# Patient Record
Sex: MALE | Race: WHITE | NOT HISPANIC OR LATINO | URBAN - METROPOLITAN AREA
[De-identification: names, ages, dates, MRNs, and addresses within clinical notes are randomized per-mention and may not be internally consistent; named-entity substitution may affect disease eponyms.]

---

## 2020-08-23 ENCOUNTER — EMERGENCY (EMERGENCY)
Facility: HOSPITAL | Age: 55
LOS: 1 days | Discharge: HOME | End: 2020-08-23
Admitting: EMERGENCY MEDICINE
Payer: COMMERCIAL

## 2020-08-23 VITALS
SYSTOLIC BLOOD PRESSURE: 124 MMHG | TEMPERATURE: 98 F | OXYGEN SATURATION: 97 % | RESPIRATION RATE: 20 BRPM | DIASTOLIC BLOOD PRESSURE: 75 MMHG | HEART RATE: 119 BPM

## 2020-08-23 DIAGNOSIS — Z20.828 CONTACT WITH AND (SUSPECTED) EXPOSURE TO OTHER VIRAL COMMUNICABLE DISEASES: ICD-10-CM

## 2020-08-23 PROCEDURE — 99283 EMERGENCY DEPT VISIT LOW MDM: CPT

## 2020-08-23 NOTE — ED PROVIDER NOTE - OBJECTIVE STATEMENT
55 year old male with no PMHx presents for COVID swab for flight on friday. no symptoms currently  Denies sore throat, cough, SOB, CP, palpitations, wheezing, abdominal pain, N/V/D/C, change in urinary/bowel function, dysuria, hematuria, flank pain, malaise, rash, HA, and dizziness.  No recent travel or sick contact noted.

## 2020-08-23 NOTE — ED PROVIDER NOTE - NSFOLLOWUPINSTRUCTIONS_ED_ALL_ED_FT
Follow up with your primary care doctor   Please review the covid discharge packet  Take tylenol 975mg every 6 hours as needed for fever  Stay self isolated for 14 days from onset of your symptoms.  You may need to self isolate longer if you have symptoms beyond 14 days.    Generally speaking, you should have at least 2 days of no symptom including feeling feverish or chills before going out of self isolation.   You can be contagious even without symptoms.   Return immediately for any new or worsening symptoms or any new concerns

## 2020-08-23 NOTE — ED PROVIDER NOTE - PATIENT PORTAL LINK FT
You can access the FollowMyHealth Patient Portal offered by [facility] by registering at the following website: [email or web address]. By joining [facility]’s FollowMyHealth portal, you will also be able to view your health information using other applications (apps) compatible with our system.

## 2020-08-28 ENCOUNTER — EMERGENCY (EMERGENCY)
Facility: HOSPITAL | Age: 55
LOS: 1 days | Discharge: HOME | End: 2020-08-28
Admitting: EMERGENCY MEDICINE
Payer: COMMERCIAL

## 2020-08-28 VITALS
WEIGHT: 176.37 LBS | OXYGEN SATURATION: 97 % | HEART RATE: 80 BPM | HEIGHT: 67 IN | SYSTOLIC BLOOD PRESSURE: 130 MMHG | TEMPERATURE: 99 F | DIASTOLIC BLOOD PRESSURE: 71 MMHG | RESPIRATION RATE: 17 BRPM

## 2020-08-28 PROCEDURE — 99283 EMERGENCY DEPT VISIT LOW MDM: CPT

## 2020-08-28 NOTE — ED PROVIDER NOTE - CLINICAL SUMMARY MEDICAL DECISION MAKING FREE TEXT BOX
patient present for covid testing. asymptomatic, no known exposure to COVID + individuals. swab sent.

## 2020-08-28 NOTE — ED PROVIDER NOTE - PATIENT PORTAL LINK FT
You can access the FollowMyHealth Patient Portal offered by [facility] by registering at the following website: [email or web address]. By joining [employer]’s FollowMyHealth portal, you will also be able to view your health information using other applications (apps) compatible with our system.

## 2020-09-01 DIAGNOSIS — Z20.828 CONTACT WITH AND (SUSPECTED) EXPOSURE TO OTHER VIRAL COMMUNICABLE DISEASES: ICD-10-CM

## 2021-12-23 ENCOUNTER — NON-APPOINTMENT (OUTPATIENT)
Age: 56
End: 2021-12-23

## 2021-12-23 ENCOUNTER — APPOINTMENT (OUTPATIENT)
Dept: UROLOGY | Facility: CLINIC | Age: 56
End: 2021-12-23
Payer: COMMERCIAL

## 2021-12-23 VITALS
SYSTOLIC BLOOD PRESSURE: 144 MMHG | BODY MASS INDEX: 26.68 KG/M2 | HEART RATE: 92 BPM | HEIGHT: 67 IN | TEMPERATURE: 97.1 F | WEIGHT: 170 LBS | DIASTOLIC BLOOD PRESSURE: 94 MMHG

## 2021-12-23 DIAGNOSIS — Z78.9 OTHER SPECIFIED HEALTH STATUS: ICD-10-CM

## 2021-12-23 PROBLEM — Z00.00 ENCOUNTER FOR PREVENTIVE HEALTH EXAMINATION: Status: ACTIVE | Noted: 2021-12-23

## 2021-12-23 PROCEDURE — 51798 US URINE CAPACITY MEASURE: CPT

## 2021-12-23 PROCEDURE — 99204 OFFICE O/P NEW MOD 45 MIN: CPT

## 2021-12-23 PROCEDURE — 51741 ELECTRO-UROFLOWMETRY FIRST: CPT

## 2021-12-23 RX ORDER — BETAMETHASONE DIPROPIONATE 0.5 MG/G
0.05 CREAM TOPICAL TWICE DAILY
Qty: 1 | Refills: 0 | Status: ACTIVE | COMMUNITY
Start: 2021-12-23 | End: 1900-01-01

## 2021-12-23 RX ORDER — TESTOSTERONE CYPIONATE 100 MG/ML
100 INJECTION, SOLUTION INTRAMUSCULAR
Refills: 0 | Status: ACTIVE | COMMUNITY

## 2021-12-24 NOTE — ASSESSMENT
[FreeTextEntry1] : Mr Saenz is a 56 year old rodas male with :\par 1. sexual dysfunction\par 2. phimosis\par 3. LUTS\par \par His utilizes a "constriction" ring and has variable success function.  He utilzes oral medications in conjuction.  At this rob he will continue curent regimen. When he returns from [facility] we will discuss function and options.\par \par He has mild sympoms of LUTs.  He has a low PVR (18) \par We  discussed with FRANCISCO SAENZ the possible indications for treatment of prostatic obstruction including:\par 1 urinary symptoms and quality of life issues.  At this time he has no absolute indication for medical intervention.  He does not want to pursue treatment for QOL at this time.\par \par We discussed the findings of a probable spermaticocele (right ) and an atrophic testes.  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  We discussed proceeding with scrotal ultrasound when patient returns from [place].\par \par He has phimosis with chronic inlammation.\par He notes pain with retraction of the foreskin during sexual intercourse \par I discussed  withFRANCISCO SAENZ  that a circumcision is a surgical procedure that removes the prepuce which is the  the skin that covers the glans or "head" of the penis.\par He is not willing to proceed We discussed elective nature of procedure.\par We discussed  option of attempting betamethasone cream for short course for phimosis\par \par He also had chronic scrotal skin changes that he states occurs wheh he utilizes a rubber constriction ring\par We discussed:  .1 skin moisturizers and 2. topical hydrocortisone  )(0.25%) OTC\par \par \par \par

## 2021-12-24 NOTE — HISTORY OF PRESENT ILLNESS
[FreeTextEntry1] : 56 year old [place] "nose" for [employer]\par single; rodas; sexually active \par utilizes condoms\par HIV (-); STI testing negative\par complaining:\par 1. erectile dysfunction persistent; attempted viagra 100mg ; variable by partner; variable response\par attempted cialis not as effective as viagra; JENELLE 5\par 2. phimosis and dysuria\par 3. nocturia 2-3; mild frequency; poor stream; no treatment; IPSS 18\par

## 2021-12-24 NOTE — LETTER BODY
[FreeTextEntry2] : Angel Negron MD\par [address]\par [address]\par [facility], [place] [address]\par  [FreeTextEntry1] : Dear Angel,\par \par \par Thank you for your kind referral.  I am enclosing a copy of my office note for your information.\par \par I will keep you informed of any developments.\par \par Feel free to contact me if you have any questions.\par \par Sincerely,\par \par Danyel Valerio MD, FACS\par Professor of Urology\par [facility]\par \par [address]\par [place], [place] [address]\par \par [address]\[place], [place] [address]\par \par Office Telephone \par [phone]\par \par Fax\par [phone]\par \par \par

## 2021-12-24 NOTE — PHYSICAL EXAM
[Abdomen Tenderness] : non-tender [] : no hepato-splenomegaly [Abdomen Mass (___ Cm)] : no abdominal mass palpated [Penis Abnormality] : normal uncircumcised penis [Prostate Enlargement] : the prostate was not enlarged [Prostate Tenderness] : the prostate was not tender [No Prostate Nodules] : no prostate nodules [FreeTextEntry1] : atrophic left testicle; right spermatocele; chronic changes of scrotal skin with thickening; PVR 18 cc; flow 8.3 cc sec; volume 80.5 cc

## 2022-02-17 ENCOUNTER — APPOINTMENT (OUTPATIENT)
Dept: UROLOGY | Facility: CLINIC | Age: 57
End: 2022-02-17

## 2022-03-17 ENCOUNTER — APPOINTMENT (OUTPATIENT)
Dept: UROLOGY | Facility: CLINIC | Age: 57
End: 2022-03-17

## 2022-03-31 ENCOUNTER — APPOINTMENT (OUTPATIENT)
Dept: UROLOGY | Facility: CLINIC | Age: 57
End: 2022-03-31
Payer: COMMERCIAL

## 2022-03-31 PROCEDURE — 99214 OFFICE O/P EST MOD 30 MIN: CPT

## 2022-03-31 NOTE — PHYSICAL EXAM
[Penis Abnormality] : normal uncircumcised penis [Prostate Enlargement] : the prostate was not enlarged [Prostate Tenderness] : the prostate was not tender [No Prostate Nodules] : no prostate nodules [FreeTextEntry1] : phimotic but able to retracted; atrophic left testicle; right testicel without masses  ; chronic thickened changes of scrotal skin with thickening

## 2022-03-31 NOTE — HISTORY OF PRESENT ILLNESS
[FreeTextEntry1] : 56 year old [place]ian "nose" for [employer]\par single; rodas; sexually active \par utilizes condoms\par HIV (-); STI testing negative\par complaining:\par 1. erectile dysfunction persistent; attempted viagra 100mg ; variable by partner; variable response\par attempted cialis not as effective as viagra; JENELLE\par 3. nocturia 2-3; mild frequency; poor stream; no treatment; IPSS 18\par \par 3.31.2022\par returns  re phimosis and scrotal pathology

## 2022-03-31 NOTE — ASSESSMENT
[FreeTextEntry1] : Mr Saenz is a 56 year old rodas male with :\par 1. sexual dysfunction\par 2. phimosis\par 3. LUTS\par \par His utilizes a "constriction" ring and has variable success function.  He utilzes oral medications in conjuction.  At this rob he will continue curent regimen. When he returns from [facility] we will discuss function and options.\par \par He has mild sympoms of LUTs.  He has a low PVR (18) \par We  discussed with FRANCISCO SAENZ the possible indications for treatment of prostatic obstruction including:\par 1 urinary symptoms and quality of life issues.  At this time he has no absolute indication for medical intervention.  He does not want to pursue treatment for QOL at this time.\par \par We discussed the findings of a probable spermaticocele (right ) and an atrophic testes.  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  We discussed proceeding with scrotal ultrasound when patient returns from [facility].\par \par He has phimosis with chronic inflammation.\par He notes pain with retraction of the foreskin during sexual intercourse \par I discussed  with FRANCISCO SAENZ  that a circumcision is a surgical procedure that removes the prepuce which is the  the skin that covers the glans or "head" of the penis.\par He is not willing to proceed We discussed elective nature of procedure.\par We discussed  option of attempting betamethasone cream for short course for phimosis\par \par He also had chronic scrotal skin changes that he states occurs wheh he utilizes a rubber constriction ring\par We discussed:  .1 skin moisturizers and 2. topical hydrocortisone  )(0.25%) OTC\par \par \par \par 3.31.2022\par reexamination confirms phimosis\par patient has not utilized betamethasone\par not willing to proceed with circumcision\par \par scrotal skin continues to be thickened\par he states this has been long standing\par discussed likely reaction to encompasing penis when using rubber cock ring\par discussed avoiding "cock ring" \par utilizing viagra 100 mg with good response\par \par continues on Testosterone under direction of Dr Negron\par patient will have results from labs \par he has not has labs for 6 mongs

## 2022-04-01 LAB
ALBUMIN SERPL ELPH-MCNC: 4.9 G/DL
ALP BLD-CCNC: 73 U/L
ALT SERPL-CCNC: 22 U/L
ANION GAP SERPL CALC-SCNC: 11 MMOL/L
AST SERPL-CCNC: 22 U/L
BILIRUB SERPL-MCNC: 0.3 MG/DL
BUN SERPL-MCNC: 24 MG/DL
CALCIUM SERPL-MCNC: 9.6 MG/DL
CHLORIDE SERPL-SCNC: 98 MMOL/L
CO2 SERPL-SCNC: 27 MMOL/L
CREAT SERPL-MCNC: 0.97 MG/DL
EGFR: 93 ML/MIN/1.73M2
GLUCOSE SERPL-MCNC: 91 MG/DL
HCT VFR BLD CALC: 46.7 %
HGB BLD-MCNC: 15.5 G/DL
POTASSIUM SERPL-SCNC: 4.5 MMOL/L
PROT SERPL-MCNC: 7.4 G/DL
PSA SERPL-MCNC: 0.84 NG/ML
SODIUM SERPL-SCNC: 136 MMOL/L
TESTOST SERPL-MCNC: 453 NG/DL

## 2022-05-24 PROBLEM — N43.40 SPERMATOCELE: Status: ACTIVE | Noted: 2021-12-23

## 2022-05-24 PROBLEM — E29.1 HYPOGONADISM IN MALE: Status: RESOLVED | Noted: 2022-03-31 | Resolved: 2022-05-24

## 2022-05-24 PROBLEM — N50.0 ATROPHY, TESTIS: Status: ACTIVE | Noted: 2021-12-23

## 2022-05-25 ENCOUNTER — APPOINTMENT (OUTPATIENT)
Dept: UROLOGY | Facility: CLINIC | Age: 57
End: 2022-05-25

## 2022-05-25 DIAGNOSIS — N50.0 ATROPHY OF TESTIS: ICD-10-CM

## 2022-05-25 DIAGNOSIS — N43.40 SPERMATOCELE OF EPIDIDYMIS, UNSPECIFIED: ICD-10-CM

## 2022-05-25 DIAGNOSIS — E29.1 TESTICULAR HYPOFUNCTION: ICD-10-CM

## 2022-06-21 ENCOUNTER — APPOINTMENT (OUTPATIENT)
Dept: UROLOGY | Facility: CLINIC | Age: 57
End: 2022-06-21
Payer: COMMERCIAL

## 2022-06-21 VITALS — DIASTOLIC BLOOD PRESSURE: 77 MMHG | TEMPERATURE: 97.9 F | SYSTOLIC BLOOD PRESSURE: 126 MMHG | HEART RATE: 108 BPM

## 2022-06-21 DIAGNOSIS — N41.8 OTHER INFLAMMATORY DISEASES OF PROSTATE: ICD-10-CM

## 2022-06-21 DIAGNOSIS — B96.89 OTHER INFLAMMATORY DISEASES OF PROSTATE: ICD-10-CM

## 2022-06-21 PROCEDURE — 99213 OFFICE O/P EST LOW 20 MIN: CPT

## 2022-06-21 NOTE — LETTER BODY
[FreeTextEntry2] : Angel Negron MD\par [address]\par [address]\[place] [address]\par  [FreeTextEntry1] : Dear Angel,\par \par \par I had the opportunity to see your patient, Mr. FRANCISCO SAENZ in followup. I am enclosing my office note for your information.\par \par I will keep you informed of any developments.\par \par Feel free to contact me if you have any questions.\par \par Sincerely,\par \par Danyel Valerio MD, FACS\par Professor of Urology\par [facility]\par \par [address]\par [place], [place] [address]\par \par [address]\par [place], [place] [address]\par \par Office Telephone \par [phone]\par \par Fax\par [phone]\par

## 2022-06-21 NOTE — HISTORY OF PRESENT ILLNESS
[FreeTextEntry1] : 56 year old [place] "nose" for [employer]\par single; rodas; sexually active \par utilizes condoms\par HIV (-); STI testing negative\par complaining:\par 1. erectile dysfunction persistent; attempted viagra 100mg ; variable by partner; variable response\par attempted cialis not as effective as viagra; JENELLE\par 3. nocturia 2-3; mild frequency; poor stream; no treatment; IPSS 18\par \par 3.31.2022\par returns  re phimosis and scrotal pathology\par \par 5.25.2022\par \par 6.21.2022\par patient treated for prostatitis\par PROTEUS\par treated with cefuroxime 1 q 12 x 20 days (completed 18)\par complaining of lack of libido and decreased erectile function\par last sexual activity 3 weeks\par insertive sexual active; condoms [Urinary Incontinence] : no urinary incontinence [Urinary Urgency] : no urinary urgency [Urinary Frequency] : no urinary frequency [Nocturia] : no nocturia

## 2022-06-21 NOTE — PHYSICAL EXAM
[Penis Abnormality] : normal uncircumcised penis [Prostate Enlargement] : the prostate was not enlarged [Prostate Tenderness] : the prostate was not tender [No Prostate Nodules] : no prostate nodules [FreeTextEntry1] : phimotic but able to retracted;; right testicel without masses  ; chronic thickened changes of scrotal skin with thickening improved ; skin is softer and elastic

## 2022-06-21 NOTE — ASSESSMENT
[FreeTextEntry1] : Mr Saenz is a 56 year old rodas male with :\par 1. sexual dysfunction\par 2. phimosis\par 3. LUTS\par \par His utilizes a "constriction" ring and has variable success function.  He utilizes oral medications in conjunction.  At this rob he will continue current regimen. When he returns from [facility] we will discuss function and options.\par \par He has mild sympoms of LUTs.  He has a low PVR (18) \par We  discussed with FRANCISCO SAENZ the possible indications for treatment of prostatic obstruction including:\par 1 urinary symptoms and quality of life issues.  At this time he has no absolute indication for medical intervention.  He does not want to pursue treatment for QOL at this time.\par \par We discussed the findings of a probable spermatocele (right ) and an atrophic testes.  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  We discussed proceeding with scrotal ultrasound when patient returns from [facility].\par \par He has phimosis with chronic inflammation.\par He notes pain with retraction of the foreskin during sexual intercourse \par I discussed  with FRANCISCO SAENZ  that a circumcision is a surgical procedure that removes the prepuce which is the  the skin that covers the glans or "head" of the penis.\par He is not willing to proceed We discussed elective nature of procedure.\par We discussed  option of attempting betamethasone cream for short course for phimosis\par \par He also had chronic scrotal skin changes that he states occurs wheh he utilizes a rubber constriction ring\par We discussed:  .1 skin moisturizers and 2. topical hydrocortisone  )(0.25%) OTC\par \par \par \par 6.22.2022\par Patient returns.  He has an episode of acute prostatitis with severe perineal pain.  He was afebrile.  He was seen by Dr. Angel Negron.  Labs at that time demonstrated: \par wbc 11.8\par hct 42.3\par t 537\par psa 4.8\par urine proteus mirabilis \par renal USG normal\par GALL BLADDER POLYPS\par Patient responded immediately to cefuroxime.  He is asymptomatic at this time.  He is completing his course of antibiotics.  We discussed the need for follow-up urine culture and repeat CBC.  He is scheduled to see Dr. Negron next week.  His PSA was elevated at the time of the infection.  Prior PSA was normal.  This is consistent with acute prostatitis.  He also underwent a renal ultrasound this was normal.  However, there were polyps noted in the gallbladder.  We discussed this and the need for follow-up.  He will review this with Dr. Negron.  He was appreciated.\par \par He continues to complain of phimosis which is painful with erection.  We again discussed elective circumcision.  He is unwilling to proceed with this.  We discussed the utilization of betamethasone cream.  We discussed the limitation of this both in terms of affect and in terms of the duration of utilization.  He wants to attempt this again for 14 days.  We discussed gentle traction on the foreskin with the betamethasone to dilate the skin.  We also discussed the utilization of dilators.  I discussed that I have no personal experience with this but that has been reported.\par \par Plan:\par 1.  Betamethasone cream\par 2.  Follow-up with Dr. Negron regarding: A.  White blood cell count B.  Urine culture after cessation of antibiotics C.  Gallbladder findings\par 3.  Follow-up with me in 2 months\par recommend ciricumscision\par patient wants to continue to attempt betamathasome\par \par

## 2022-07-11 LAB
APPEARANCE: ABNORMAL
BACTERIA: ABNORMAL
BILIRUBIN URINE: NEGATIVE
BLOOD URINE: NEGATIVE
COLOR: ABNORMAL
GLUCOSE QUALITATIVE U: NEGATIVE
HYALINE CASTS: 2 /LPF
KETONES URINE: NEGATIVE
LEUKOCYTE ESTERASE URINE: ABNORMAL
MICROSCOPIC-UA: NORMAL
NITRITE URINE: NEGATIVE
PH URINE: 8
PROTEIN URINE: ABNORMAL
RED BLOOD CELLS URINE: 1 /HPF
SPECIFIC GRAVITY URINE: 1.01
SQUAMOUS EPITHELIAL CELLS: 2 /HPF
UROBILINOGEN URINE: NORMAL
WHITE BLOOD CELLS URINE: 14 /HPF

## 2022-07-12 ENCOUNTER — APPOINTMENT (OUTPATIENT)
Dept: UROLOGY | Facility: CLINIC | Age: 57
End: 2022-07-12

## 2022-07-12 VITALS
HEART RATE: 80 BPM | DIASTOLIC BLOOD PRESSURE: 82 MMHG | BODY MASS INDEX: 27 KG/M2 | SYSTOLIC BLOOD PRESSURE: 114 MMHG | TEMPERATURE: 98.3 F | HEIGHT: 67 IN | WEIGHT: 172 LBS

## 2022-07-12 DIAGNOSIS — A49.9 URINARY TRACT INFECTION, SITE NOT SPECIFIED: ICD-10-CM

## 2022-07-12 DIAGNOSIS — N39.0 URINARY TRACT INFECTION, SITE NOT SPECIFIED: ICD-10-CM

## 2022-07-12 LAB — BACTERIA UR CULT: ABNORMAL

## 2022-07-12 PROCEDURE — 99213 OFFICE O/P EST LOW 20 MIN: CPT

## 2022-07-12 PROCEDURE — 51798 US URINE CAPACITY MEASURE: CPT

## 2022-07-12 RX ORDER — SULFAMETHOXAZOLE AND TRIMETHOPRIM 800; 160 MG/1; MG/1
800-160 TABLET ORAL TWICE DAILY
Qty: 28 | Refills: 0 | Status: ACTIVE | COMMUNITY
Start: 2022-07-12 | End: 1900-01-01

## 2022-07-12 NOTE — PHYSICAL EXAM
[Penis Abnormality] : normal uncircumcised penis [Prostate Enlargement] : the prostate was not enlarged [Prostate Tenderness] : the prostate was not tender [No Prostate Nodules] : no prostate nodules [FreeTextEntry1] : phimotic but able to retract; PVR 45

## 2022-07-12 NOTE — ASSESSMENT
[FreeTextEntry1] : Mr Saenz is a 56 year old rodas male with :\par 1. sexual dysfunction\par 2. phimosis\par 3. LUTS\par \par His utilizes a "constriction" ring and has variable success function.  He utilizes oral medications in conjunction.  At this rob he will continue current regimen. When he returns from [facility] we will discuss function and options.\par \par He has mild sympoms of LUTs.  He has a low PVR (18) \par We  discussed with FRANCISCO SAENZ the possible indications for treatment of prostatic obstruction including:\par 1 urinary symptoms and quality of life issues.  At this time he has no absolute indication for medical intervention.  He does not want to pursue treatment for QOL at this time.\par \par We discussed the findings of a probable spermatocele (right ) and an atrophic testes.  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  We discussed proceeding with scrotal ultrasound when patient returns from [facility].\par \par He has phimosis with chronic inflammation.\par He notes pain with retraction of the foreskin during sexual intercourse \par I discussed  with FRANCISCO SAENZ  that a circumcision is a surgical procedure that removes the prepuce which is the  the skin that covers the glans or "head" of the penis.\par He is not willing to proceed We discussed elective nature of procedure.\par We discussed  option of attempting betamethasone cream for short course for phimosis\par \par He also had chronic scrotal skin changes that he states occurs wheh he utilizes a rubber constriction ring\par We discussed:  .1 skin moisturizers and 2. topical hydrocortisone  )(0.25%) OTC\par \par \par \par 6.22.2022\par Patient returns.  He has an episode of acute prostatitis with severe perineal pain.  He was afebrile.  He was seen by Dr. Angel Negron.  Labs at that time demonstrated: \par wbc 11.8\par hct 42.3\par t 537\par psa 4.8\par urine proteus mirabilis \par renal USG normal\par GALL BLADDER POLYPS\par Patient responded immediately to cefuroxime.  He is asymptomatic at this time.  He is completing his course of antibiotics.  We discussed the need for follow-up urine culture and repeat CBC.  He is scheduled to see Dr. Negron next week.  His PSA was elevated at the time of the infection.  Prior PSA was normal.  This is consistent with acute prostatitis.  He also underwent a renal ultrasound this was normal.  However, there were polyps noted in the gallbladder.  We discussed this and the need for follow-up.  He will review this with Dr. Negron.  He was appreciated.\par \par He continues to complain of phimosis which is painful with erection.  We again discussed elective circumcision.  He is unwilling to proceed with this.  We discussed the utilization of betamethasone cream.  We discussed the limitation of this both in terms of affect and in terms of the duration of utilization.  He wants to attempt this again for 14 days.  We discussed gentle traction on the foreskin with the betamethasone to dilate the skin.  We also discussed the utilization of dilators.  I discussed that I have no personal experience with this but that has been reported.\par \par Plan:\par 1.  Betamethasone cream\par 2.  Follow-up with Dr. Negron regarding: A.  White blood cell count B.  Urine culture after cessation of antibiotics C.  Gallbladder findings\par 3.  Follow-up with me in 2 months\par recommend ciricumscision\par patient wants to continue to attempt betamathasome\par \par \par 7.12.2022\par returns with symtoms of :\par 1. incomplete voiding\par 2 dysuria\par discussed recurrent vs persistent infection and need to document resolution fo UTI\par will change to TMP/SX warned re allergic reaction and avoidance of sun\par Denies allergies\par Reports and normal renal ultrasound with Dr Negron\par Plan\par 1. TMP/SX\par 2. repeat urine culture after 2 weeks antibiotics\par \par

## 2022-07-12 NOTE — HISTORY OF PRESENT ILLNESS
[FreeTextEntry1] : 56 year old [place] "nose" for [employer]\par single; rodas; sexually active \par utilizes condoms\par HIV (-); STI testing negative\par complaining:\par 1. erectile dysfunction persistent; attempted viagra 100mg ; variable by partner; variable response\par attempted cialis not as effective as viagra; JENELLE\par 3. nocturia 2-3; mild frequency; poor stream; no treatment; IPSS 18\par \par 3.31.2022\par returns  re phimosis and scrotal pathology\par \par 5.25.2022\par \par 6.21.2022\par patient treated for prostatitis\par PROTEUS\par treated with cefuroxime 1 q 12 x 20 days (completed 18)\par complaining of lack of libido and decreased erectile function\par last sexual activity 3 weeks\par insertive sexual active; condoms\par \par 7.12.2022\par returns after completion\par after completion of antibiotics was asympotomatic\par 7 day later again developed symptoms\par

## 2022-07-13 ENCOUNTER — APPOINTMENT (OUTPATIENT)
Dept: UROLOGY | Facility: CLINIC | Age: 57
End: 2022-07-13

## 2022-08-01 LAB — BACTERIA UR CULT: NORMAL

## 2022-08-10 ENCOUNTER — APPOINTMENT (OUTPATIENT)
Dept: UROLOGY | Facility: CLINIC | Age: 57
End: 2022-08-10

## 2022-08-10 VITALS
OXYGEN SATURATION: 98 % | HEART RATE: 80 BPM | TEMPERATURE: 98.3 F | SYSTOLIC BLOOD PRESSURE: 137 MMHG | DIASTOLIC BLOOD PRESSURE: 86 MMHG

## 2022-08-10 PROCEDURE — 99213 OFFICE O/P EST LOW 20 MIN: CPT

## 2022-08-10 NOTE — HISTORY OF PRESENT ILLNESS
[FreeTextEntry1] : 56 year old [place] "nose" for [employer]\par single; rodas; sexually active \par utilizes condoms\par HIV (-); STI testing negative\par complaining:\par 1. erectile dysfunction persistent; attempted viagra 100mg ; variable by partner; variable response\par attempted cialis not as effective as viagra; JENELLE\par 3. nocturia 2-3; mild frequency; poor stream; no treatment; IPSS 18\par \par 3.31.2022\par returns  re phimosis and scrotal pathology\par \par 5.25.2022\par \par 6.21.2022\par patient treated for prostatitis\par PROTEUS\par treated with cefuroxime 1 q 12 x 20 days (completed 18)\par complaining of lack of libido and decreased erectile function\par last sexual activity 3 weeks\par insertive sexual active; condoms\par \par 7.12.2022\par returns after completion\par after completion of antibiotics was asympotomatic\par 7 day later again developed symptoms\par \par 08.10.2022\par presents for repeat urine culture after completing antibiotics\par reports that symptoms have resolved

## 2022-08-10 NOTE — ADDENDUM
[FreeTextEntry1] : A portion of this note was written by [Deny Huddleston] acting as a scribe for Dr. Danyel Valerio.

## 2022-08-10 NOTE — ASSESSMENT
[FreeTextEntry1] : Mr Saenz is a 56 year old rodas male with :\par 1. sexual dysfunction\par 2. phimosis\par 3. LUTS\par \par His utilizes a "constriction" ring and has variable success function.  He utilizes oral medications in conjunction.  At this rob he will continue current regimen. When he returns from [facility] we will discuss function and options.\par \par He has mild sympoms of LUTs.  He has a low PVR (18) \par We  discussed with FRANCISCO SAENZ the possible indications for treatment of prostatic obstruction including:\par 1 urinary symptoms and quality of life issues.  At this time he has no absolute indication for medical intervention.  He does not want to pursue treatment for QOL at this time.\par \par We discussed the findings of a probable spermatocele (right ) and an atrophic testes.  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  We discussed proceeding with scrotal ultrasound when patient returns from [facility].\par \par He has phimosis with chronic inflammation.\par He notes pain with retraction of the foreskin during sexual intercourse \par I discussed  with FRANCISCO SAENZ  that a circumcision is a surgical procedure that removes the prepuce which is the  the skin that covers the glans or "head" of the penis.\par He is not willing to proceed We discussed elective nature of procedure.\par We discussed  option of attempting betamethasone cream for short course for phimosis\par \par He also had chronic scrotal skin changes that he states occurs wheh he utilizes a rubber constriction ring\par We discussed:  .1 skin moisturizers and 2. topical hydrocortisone  )(0.25%) OTC\par \par \par \par 6.22.2022\par Patient returns.  He has an episode of acute prostatitis with severe perineal pain.  He was afebrile.  He was seen by Dr. Angel Negron.  Labs at that time demonstrated: \par wbc 11.8\par hct 42.3\par t 537\par psa 4.8\par urine proteus mirabilis \par renal USG normal\par GALL BLADDER POLYPS\par Patient responded immediately to cefuroxime.  He is asymptomatic at this time.  He is completing his course of antibiotics.  We discussed the need for follow-up urine culture and repeat CBC.  He is scheduled to see Dr. Negron next week.  His PSA was elevated at the time of the infection.  Prior PSA was normal.  This is consistent with acute prostatitis.  He also underwent a renal ultrasound this was normal.  However, there were polyps noted in the gallbladder.  We discussed this and the need for follow-up.  He will review this with Dr. Negron.  He was appreciated.\par \par He continues to complain of phimosis which is painful with erection.  We again discussed elective circumcision.  He is unwilling to proceed with this.  We discussed the utilization of betamethasone cream.  We discussed the limitation of this both in terms of affect and in terms of the duration of utilization.  He wants to attempt this again for 14 days.  We discussed gentle traction on the foreskin with the betamethasone to dilate the skin.  We also discussed the utilization of dilators.  I discussed that I have no personal experience with this but that has been reported.\par \par Plan:\par 1.  Betamethasone cream\par 2.  Follow-up with Dr. Negron regarding: A.  White blood cell count B.  Urine culture after cessation of antibiotics C.  Gallbladder findings\par 3.  Follow-up with me in 2 months\par recommend ciricumscision\par patient wants to continue to attempt betamathasome\par \par \par 7.12.2022\par returns with symptoms of :\par 1. incomplete voiding\par 2 dysuria\par discussed recurrent vs persistent infection and need to document resolution fo UTI\par will change to TMP/SX warned re allergic reaction and avoidance of sun\par Denies allergies\par Reports and normal renal ultrasound with Dr Negron\par Plan\par 1. TMP/SX\par 2. repeat urine culture after 2 weeks antibiotics\par \par 08.10.2022\par Reports that symptoms have resolved after TMP/SX\par \par discussed phimosis and elective circumcision\par patient does not want to proceed but wants to manage with hygiene, steroid cream sparingly as needed and manual retraction\par \par \par Plan\par 1. Follow up in 3 months\par 2. hygiene\par 3. betamethasone \par The FRANCISCO LETTY  expressed fully understanding of the information provided, the consequences and the management.\par

## 2022-11-09 ENCOUNTER — APPOINTMENT (OUTPATIENT)
Dept: UROLOGY | Facility: CLINIC | Age: 57
End: 2022-11-09
Payer: SELF-PAY

## 2022-11-09 VITALS
HEART RATE: 78 BPM | TEMPERATURE: 97.3 F | OXYGEN SATURATION: 98 % | DIASTOLIC BLOOD PRESSURE: 84 MMHG | SYSTOLIC BLOOD PRESSURE: 131 MMHG

## 2022-11-09 DIAGNOSIS — R37 SEXUAL DYSFUNCTION, UNSPECIFIED: ICD-10-CM

## 2022-11-09 DIAGNOSIS — R39.9 UNSPECIFIED SYMPTOMS AND SIGNS INVOLVING THE GENITOURINARY SYSTEM: ICD-10-CM

## 2022-11-09 DIAGNOSIS — N47.1 PHIMOSIS: ICD-10-CM

## 2022-11-09 PROCEDURE — 51798 US URINE CAPACITY MEASURE: CPT

## 2022-11-09 PROCEDURE — 51741 ELECTRO-UROFLOWMETRY FIRST: CPT

## 2022-11-09 PROCEDURE — 99214 OFFICE O/P EST MOD 30 MIN: CPT

## 2022-11-09 RX ORDER — SILDENAFIL 100 MG/1
100 TABLET, FILM COATED ORAL
Qty: 10 | Refills: 3 | Status: ACTIVE | COMMUNITY
Start: 2022-11-09 | End: 1900-01-01

## 2022-11-09 RX ORDER — BETAMETHASONE VALERATE 1 MG/G
0.1 CREAM TOPICAL TWICE DAILY
Qty: 1 | Refills: 0 | Status: ACTIVE | COMMUNITY
Start: 2022-11-09 | End: 1900-01-01

## 2022-11-09 NOTE — HISTORY OF PRESENT ILLNESS
[FreeTextEntry1] : 56 year old [place] "[employer]" for [employer]\par single; rodas; sexually active \par utilizes condoms\par HIV (-); STI testing negative\par complaining:\par 1. erectile dysfunction persistent; attempted viagra 100mg ; variable by partner; variable response\par attempted cialis not as effective as viagra; JENELLE\par 3. nocturia 2-3; mild frequency; poor stream; no treatment; IPSS 18\par \par 3.31.2022\par returns  re phimosis and scrotal pathology\par \par 5.25.2022\par \par 6.21.2022\par patient treated for prostatitis\par PROTEUS\par treated with cefuroxime 1 q 12 x 20 days (completed 18)\par complaining of lack of libido and decreased erectile function\par last sexual activity 3 weeks\par insertive sexual active; condoms\par \par 7.12.2022\par returns after completion\par after completion of antibiotics was asymptomatic\par 7 day later again developed symptoms\par \par 08.10.2022\par presents for repeat urine culture after completing antibiotics\par reports that symptoms have resolved \par \par \par 11.9.022\par s/p treatment of prostatitis\par urinary symptoms improved\par concerned re phimosis

## 2022-11-09 NOTE — ASSESSMENT
[FreeTextEntry1] : Mr Saenz is a 56 year old rodas male with :\par 1. sexual dysfunction\par 2. phimosis\par 3. LUTS\par \par His utilizes a "constriction" ring and has variable success function.  He utilizes oral medications in conjunction.  At this rob he will continue current regimen. When he returns from [facility] we will discuss function and options.\par \par He has mild sympoms of LUTs.  He has a low PVR (18) \par We  discussed with FRANCISCO SAENZ the possible indications for treatment of prostatic obstruction including:\par 1 urinary symptoms and quality of life issues.  At this time he has no absolute indication for medical intervention.  He does not want to pursue treatment for QOL at this time.\par \par We discussed the findings of a probable spermatocele (right ) and an atrophic testes.  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  We discussed proceeding with scrotal ultrasound when patient returns from [facility].\par \par He has phimosis with chronic inflammation.\par He notes pain with retraction of the foreskin during sexual intercourse \par I discussed  with FRANCISCO SAENZ  that a circumcision is a surgical procedure that removes the prepuce which is the  the skin that covers the glans or "head" of the penis.\par He is not willing to proceed We discussed elective nature of procedure.\par We discussed  option of attempting betamethasone cream for short course for phimosis\par \par He also had chronic scrotal skin changes that he states occurs wheh he utilizes a rubber constriction ring\par We discussed:  .1 skin moisturizers and 2. topical hydrocortisone  )(0.25%) OTC\par \par \par \par 6.22.2022\par Patient returns.  He has an episode of acute prostatitis with severe perineal pain.  He was afebrile.  He was seen by Dr. Angel Negron.  Labs at that time demonstrated: \par wbc 11.8\par hct 42.3\par t 537\par psa 4.8\par urine proteus mirabilis \par renal USG normal\par GALL BLADDER POLYPS\par Patient responded immediately to cefuroxime.  He is asymptomatic at this time.  He is completing his course of antibiotics.  We discussed the need for follow-up urine culture and repeat CBC.  He is scheduled to see Dr. Negron next week.  His PSA was elevated at the time of the infection.  Prior PSA was normal.  This is consistent with acute prostatitis.  He also underwent a renal ultrasound this was normal.  However, there were polyps noted in the gallbladder.  We discussed this and the need for follow-up.  He will review this with Dr. Negron.  He was appreciated.\par \par He continues to complain of phimosis which is painful with erection.  We again discussed elective circumcision.  He is unwilling to proceed with this.  We discussed the utilization of betamethasone cream.  We discussed the limitation of this both in terms of affect and in terms of the duration of utilization.  He wants to attempt this again for 14 days.  We discussed gentle traction on the foreskin with the betamethasone to dilate the skin.  We also discussed the utilization of dilators.  I discussed that I have no personal experience with this but that has been reported.\par \par Plan:\par 1.  Betamethasone cream\par 2.  Follow-up with Dr. Negron regarding: A.  White blood cell count B.  Urine culture after cessation of antibiotics C.  Gallbladder findings\par 3.  Follow-up with me in 2 months\par recommend ciricumscision\par patient wants to continue to attempt betamathasome\par \par \par 7.12.2022\par returns with symptoms of :\par 1. incomplete voiding\par 2 dysuria\par discussed recurrent vs persistent infection and need to document resolution fo UTI\par will change to TMP/SX warned re allergic reaction and avoidance of sun\par Denies allergies\par Reports and normal renal ultrasound with Dr Negron\par Plan\par 1. TMP/SX\par 2. repeat urine culture after 2 weeks antibiotics\par \par 08.10.2022\par Reports that symptoms have resolved after TMP/SX\par \par discussed phimosis and elective circumcision\par patient does not want to proceed but wants to manage with hygiene, steroid cream sparingly as needed and manual retraction\par \par \par Plan\par 1. Follow up in 3 months\par 2. hygiene\par 3. betamethasone \par The FRANCISCO AHMADILORNE  expressed fully understanding of the information provided, the consequences and the management.\par \par \par 11.9.2022\par Patient returns today with complaint regarding his phimosis.  He is able to retract his foreskin.  However when he has an erection it is painful.  He states that this interferes with his sexual function.  He is concerned about the pain associated with pulling the foreskin back and is concerned about the psychological impact of pain on his erectile function.  He has taken oral phosphodiesterase inhibitors in the past.  The response has been variable.\par \par On examination his foreskin is retractable.  There is no significant phimosis.  However with erection he states that it is difficult to pull the foreskin back.\par Again reviewed :\par \par \par I discussed  Alie SAENZ  that a circumcision is a surgical procedure that removes the prepuce which is the  the skin that covers the glans or "head" of the penis.\par We discussed indications including:\par 1. phimosis\par 2. tearing of foreskin or frenulum with intercourse \par 3. recurrent infections\par 4. cosmetic reasons\par 5. [employer] reasons\par \par We explain that some of the common complications of a circumcision are:\par 1 pain, \par 2 bleeding,\par 3 swelling\par We discussed:\par 1 low risk of infection\par 2 injury to the urethra\par 3 change in penile sensation, \par 4. appearance of shortening.  \par We discussed elective nature of procedure.\par \par We discussed  option of attempting betamethasone cream for short course for phimosis or Mycolog/Triamcinolone for balanoposthitis.\par Patient no willing to proceed\par \par \par discussed attempting Viagra 100 mg \par Warned re priapism risk and need for immediate intervention if erection lasts more than 2 hours.  Patient instructed to report to an emergency room and explicitly inform staff of his condition and need for treatment.\par \par LUTs not interested in medical management\par Flow and PVR reviewed\par \par Plan\par 1. betamethasone\par 2 urine culture \par 3. followup in 3 months\par

## 2022-11-09 NOTE — PHYSICAL EXAM
[Penis Abnormality] : normal uncircumcised penis [Prostate Enlargement] : the prostate was not enlarged [Prostate Tenderness] : the prostate was not tender [No Prostate Nodules] : no prostate nodules [FreeTextEntry1] : phimotic but able to retract; flow  9.4cc/sec; voided volume 240.2; PVR 90

## 2022-11-11 LAB — BACTERIA UR CULT: NORMAL

## 2022-11-14 ENCOUNTER — NON-APPOINTMENT (OUTPATIENT)
Age: 57
End: 2022-11-14

## 2023-01-26 ENCOUNTER — APPOINTMENT (OUTPATIENT)
Dept: UROLOGY | Facility: CLINIC | Age: 58
End: 2023-01-26

## 2023-01-26 NOTE — ASSESSMENT
[FreeTextEntry1] : Mr Saenz is a 56 year old rodas male with :\par 1. sexual dysfunction\par 2. phimosis\par 3. LUTS\par \par His utilizes a "constriction" ring and has variable success function. He utilizes oral medications in conjunction. At this rob he will continue current regimen. When he returns from [facility] we will discuss function and options.\par \par He has mild sympoms of LUTs. He has a low PVR (18) \par We discussed with FRANCISCO SAENZ the possible indications for treatment of prostatic obstruction including:\par 1 urinary symptoms and quality of life issues. At this time he has no absolute indication for medical intervention. He does not want to pursue treatment for QOL at this time.\par \par We discussed the findings of a probable spermatocele (right ) and an atrophic testes. 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 We discussed proceeding with scrotal ultrasound when patient returns from [facility].\par \par He has phimosis with chronic inflammation.\par He notes pain with retraction of the foreskin during sexual intercourse \par I discussed with FRANCISCO SAENZ that a circumcision is a surgical procedure that removes the prepuce which is the the skin that covers the glans or "head" of the penis.\par He is not willing to proceed We discussed elective nature of procedure.\par We discussed option of attempting betamethasone cream for short course for phimosis\par \par He also had chronic scrotal skin changes that he states occurs wheh he utilizes a rubber constriction ring\par We discussed:.1 skin moisturizers and 2. topical hydrocortisone )(0.25%) OTC\par \par \par \par 6.22.2022\par Patient returns. He has an episode of acute prostatitis with severe perineal pain. He was afebrile. He was seen by Dr. Angel Negron. Labs at that time demonstrated: \par wbc 11.8\par hct 42.3\par t 537\par psa 4.8\par urine proteus mirabilis \par renal USG normal\par GALL BLADDER POLYPS\par Patient responded immediately to cefuroxime. He is asymptomatic at this time. He is completing his course of antibiotics. We discussed the need for follow-up urine culture and repeat CBC. He is scheduled to see Dr. Negron next week. His PSA was elevated at the time of the infection. Prior PSA was normal. This is consistent with acute prostatitis. He also underwent a renal ultrasound this was normal. However, there were polyps noted in the gallbladder. We discussed this and the need for follow-up. He will review this with Dr. Negron. He was appreciated.\par \par He continues to complain of phimosis which is painful with erection. We again discussed elective circumcision. He is unwilling to proceed with this. We discussed the utilization of betamethasone cream. We discussed the limitation of this both in terms of affect and in terms of the duration of utilization. He wants to attempt this again for 14 days. We discussed gentle traction on the foreskin with the betamethasone to dilate the skin. We also discussed the utilization of dilators. I discussed that I have no personal experience with this but that has been reported.\par \par Plan:\par 1. Betamethasone cream\par 2. Follow-up with Dr. Negron regarding: A. White blood cell count B. Urine culture after cessation of antibiotics C. Gallbladder findings\par 3. Follow-up with me in 2 months\par recommend ciricumscision\par patient wants to continue to attempt betamathasome\par \par \par 7.12.2022\par returns with symptoms of :\par 1. incomplete voiding\par 2 dysuria\par discussed recurrent vs persistent infection and need to document resolution fo UTI\par will change to TMP/SX warned re allergic reaction and avoidance of sun\par Denies allergies\par Reports and normal renal ultrasound with Dr Negron\par Plan\par 1. TMP/SX\par 2. repeat urine culture after 2 weeks antibiotics\par \par 08.10.2022\par Reports that symptoms have resolved after TMP/SX\par \par discussed phimosis and elective circumcision\par patient does not want to proceed but wants to manage with hygiene, steroid cream sparingly as needed and manual retraction\par \par \par Plan\par 1. Follow up in 3 months\par 2. hygiene\par 3. betamethasone \par The FRANCISCO AHMADILORNE expressed fully understanding of the information provided, the consequences and the management.\par \par \par 11.9.2022\par Patient returns today with complaint regarding his phimosis. He is able to retract his foreskin. However when he has an erection it is painful. He states that this interferes with his sexual function. He is concerned about the pain associated with pulling the foreskin back and is concerned about the psychological impact of pain on his erectile function. He has taken oral phosphodiesterase inhibitors in the past. The response has been variable.\par \par On examination his foreskin is retractable. There is no significant phimosis. However with erection he states that it is difficult to pull the foreskin back.\par Again reviewed :\par \par \par I discussed Alie SAENZ that a circumcision is a surgical procedure that removes the prepuce which is the the skin that covers the glans or "head" of the penis.\par We discussed indications including:\par 1. phimosis\par 2. tearing of foreskin or frenulum with intercourse \par 3. recurrent infections\par 4. cosmetic reasons\par 5. [employer] reasons\par \par We explain that some of the common complications of a circumcision are:\par 1 pain, \par 2 bleeding,\par 3 swelling\par We discussed:\par 1 low risk of infection\par 2 injury to the urethra\par 3 change in penile sensation, \par 4. appearance of shortening. \par We discussed elective nature of procedure.\par \par We discussed option of attempting betamethasone cream for short course for phimosis or Mycolog/Triamcinolone for balanoposthitis.\par Patient no willing to proceed\par \par \par discussed attempting Viagra 100 mg \par Warned re priapism risk and need for immediate intervention if erection lasts more than 2 hours. Patient instructed to report to an emergency room and explicitly inform staff of his condition and need for treatment.\par \par LUTs not interested in medical management\par Flow and PVR reviewed\par \par Plan\par 1. betamethasone\par 2 urine culture \par 3. followup in 3 months\par \par 1.26.23\par

## 2023-01-26 NOTE — HISTORY OF PRESENT ILLNESS
[FreeTextEntry1] : 56 year old [place] "nose" for [employer]\par single; rodas; sexually active \par utilizes condoms\par HIV (-); STI testing negative\par complaining:\par 1. erectile dysfunction persistent; attempted viagra 100mg ; variable by partner; variable response\par attempted cialis not as effective as viagra; JENELLE\par 3. nocturia 2-3; mild frequency; poor stream; no treatment; IPSS 18\par \par 3.31.2022\par returns re phimosis and scrotal pathology\par \par 5.25.2022\par \par 6.21.2022\par patient treated for prostatitis\par PROTEUS\par treated with cefuroxime 1 q 12 x 20 days (completed 18)\par complaining of lack of libido and decreased erectile function\par last sexual activity 3 weeks\par insertive sexual active; condoms\par \par 7.12.2022\par returns after completion\par after completion of antibiotics was asymptomatic\par 7 day later again developed symptoms\par \par 08.10.2022\par presents for repeat urine culture after completing antibiotics\par reports that symptoms have resolved \par \par \par 11.9.022\par s/p treatment of prostatitis\par urinary symptoms improved\par concerned re phimosis \par \par 1.26.23\par returns re phimosis after betamethasone

## 2023-02-21 ENCOUNTER — APPOINTMENT (OUTPATIENT)
Dept: UROLOGY | Facility: CLINIC | Age: 58
End: 2023-02-21

## 2023-02-21 NOTE — HISTORY OF PRESENT ILLNESS
[FreeTextEntry1] : 56 year old [place] "nose" for [employer]\par single; rodas; sexually active \par utilizes condoms\par HIV (-); STI testing negative\par complaining:\par 1. erectile dysfunction persistent; attempted viagra 100mg ; variable by partner; variable response\par attempted cialis not as effective as viagra; JENELLE\par 3. nocturia 2-3; mild frequency; poor stream; no treatment; IPSS 18\par \par 3.31.2022\par returns re phimosis and scrotal pathology\par \par 5.25.2022\par \par 6.21.2022\par patient treated for prostatitis\par PROTEUS\par treated with cefuroxime 1 q 12 x 20 days (completed 18)\par complaining of lack of libido and decreased erectile function\par last sexual activity 3 weeks\par insertive sexual active; condoms\par \par 7.12.2022\par returns after completion\par after completion of antibiotics was asymptomatic\par 7 day later again developed symptoms\par \par 08.10.2022\par presents for repeat urine culture after completing antibiotics\par reports that symptoms have resolved \par \par \par 11.9.022\par s/p treatment of prostatitis\par urinary symptoms improved\par concerned re phimosis \par \par \par 2.21.23\par returns re phimosis after betamethasone

## 2023-02-21 NOTE — ASSESSMENT
[FreeTextEntry1] : Mr Saenz is a 56 year old rodas male with :\par 1. sexual dysfunction\par 2. phimosis\par 3. LUTS\par \par His utilizes a "constriction" ring and has variable success function. He utilizes oral medications in conjunction. At this rob he will continue current regimen. When he returns from [facility] we will discuss function and options.\par \par He has mild sympoms of LUTs. He has a low PVR (18) \par We discussed with FRANCISCO SAENZ the possible indications for treatment of prostatic obstruction including:\par 1 urinary symptoms and quality of life issues. At this time he has no absolute indication for medical intervention. He does not want to pursue treatment for QOL at this time.\par \par We discussed the findings of a probable spermatocele (right ) and an atrophic testes. 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 We discussed proceeding with scrotal ultrasound when patient returns from [facility].\par \par He has phimosis with chronic inflammation.\par He notes pain with retraction of the foreskin during sexual intercourse \par I discussed with FRANCISCO SAENZ that a circumcision is a surgical procedure that removes the prepuce which is the the skin that covers the glans or "head" of the penis.\par He is not willing to proceed We discussed elective nature of procedure.\par We discussed option of attempting betamethasone cream for short course for phimosis\par \par He also had chronic scrotal skin changes that he states occurs wheh he utilizes a rubber constriction ring\par We discussed:.1 skin moisturizers and 2. topical hydrocortisone )(0.25%) OTC\par \par \par \par 6.22.2022\par Patient returns. He has an episode of acute prostatitis with severe perineal pain. He was afebrile. He was seen by Dr. Angel Negron. Labs at that time demonstrated: \par wbc 11.8\par hct 42.3\par t 537\par psa 4.8\par urine proteus mirabilis \par renal USG normal\par GALL BLADDER POLYPS\par Patient responded immediately to cefuroxime. He is asymptomatic at this time. He is completing his course of antibiotics. We discussed the need for follow-up urine culture and repeat CBC. He is scheduled to see Dr. Negron next week. His PSA was elevated at the time of the infection. Prior PSA was normal. This is consistent with acute prostatitis. He also underwent a renal ultrasound this was normal. However, there were polyps noted in the gallbladder. We discussed this and the need for follow-up. He will review this with Dr. Negron. He was appreciated.\par \par He continues to complain of phimosis which is painful with erection. We again discussed elective circumcision. He is unwilling to proceed with this. We discussed the utilization of betamethasone cream. We discussed the limitation of this both in terms of affect and in terms of the duration of utilization. He wants to attempt this again for 14 days. We discussed gentle traction on the foreskin with the betamethasone to dilate the skin. We also discussed the utilization of dilators. I discussed that I have no personal experience with this but that has been reported.\par \par Plan:\par 1. Betamethasone cream\par 2. Follow-up with Dr. Negron regarding: A. White blood cell count B. Urine culture after cessation of antibiotics C. Gallbladder findings\par 3. Follow-up with me in 2 months\par recommend ciricumscision\par patient wants to continue to attempt betamathasome\par \par \par 7.12.2022\par returns with symptoms of :\par 1. incomplete voiding\par 2 dysuria\par discussed recurrent vs persistent infection and need to document resolution fo UTI\par will change to TMP/SX warned re allergic reaction and avoidance of sun\par Denies allergies\par Reports and normal renal ultrasound with Dr Negron\par Plan\par 1. TMP/SX\par 2. repeat urine culture after 2 weeks antibiotics\par \par 08.10.2022\par Reports that symptoms have resolved after TMP/SX\par \par discussed phimosis and elective circumcision\par patient does not want to proceed but wants to manage with hygiene, steroid cream sparingly as needed and manual retraction\par \par \par Plan\par 1. Follow up in 3 months\par 2. hygiene\par 3. betamethasone \par The FRANCISCO AHMADILORNE expressed fully understanding of the information provided, the consequences and the management.\par \par \par 11.9.2022\par Patient returns today with complaint regarding his phimosis. He is able to retract his foreskin. However when he has an erection it is painful. He states that this interferes with his sexual function. He is concerned about the pain associated with pulling the foreskin back and is concerned about the psychological impact of pain on his erectile function. He has taken oral phosphodiesterase inhibitors in the past. The response has been variable.\par \par On examination his foreskin is retractable. There is no significant phimosis. However with erection he states that it is difficult to pull the foreskin back.\par Again reviewed :\par \par \par I discussed Alie SAENZ that a circumcision is a surgical procedure that removes the prepuce which is the the skin that covers the glans or "head" of the penis.\par We discussed indications including:\par 1. phimosis\par 2. tearing of foreskin or frenulum with intercourse \par 3. recurrent infections\par 4. cosmetic reasons\par 5. Druze reasons\par \par We explain that some of the common complications of a circumcision are:\par 1 pain, \par 2 bleeding,\par 3 swelling\par We discussed:\par 1 low risk of infection\par 2 injury to the urethra\par 3 change in penile sensation, \par 4. appearance of shortening. \par We discussed elective nature of procedure.\par \par We discussed option of attempting betamethasone cream for short course for phimosis or Mycolog/Triamcinolone for balanoposthitis.\par Patient no willing to proceed\par \par \par discussed attempting Viagra 100 mg \par Warned re priapism risk and need for immediate intervention if erection lasts more than 2 hours. Patient instructed to report to an emergency room and explicitly inform staff of his condition and need for treatment.\par \par LUTs not interested in medical management\par Flow and PVR reviewed\par \par Plan\par 1. betamethasone\par 2 urine culture \par 3. followup in 3 months\par \par 1.26.23\par betamethasone for phimosis\par

## 2023-03-07 ENCOUNTER — APPOINTMENT (OUTPATIENT)
Dept: UROLOGY | Facility: CLINIC | Age: 58
End: 2023-03-07

## 2023-03-07 NOTE — ASSESSMENT
[FreeTextEntry1] : Mr Saenz is a 56 year old rodas male with :\par 1. sexual dysfunction\par 2. phimosis\par 3. LUTS\par \par His utilizes a "constriction" ring and has variable success function. He utilizes oral medications in conjunction. At this rob he will continue current regimen. When he returns from [facility] we will discuss function and options.\par \par He has mild sympoms of LUTs. He has a low PVR (18) \par We discussed with FRANCISCO SAENZ the possible indications for treatment of prostatic obstruction including:\par 1 urinary symptoms and quality of life issues. At this time he has no absolute indication for medical intervention. He does not want to pursue treatment for QOL at this time.\par \par We discussed the findings of a probable spermatocele (right ) and an atrophic testes. 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 We discussed proceeding with scrotal ultrasound when patient returns from [facility].\par \par He has phimosis with chronic inflammation.\par He notes pain with retraction of the foreskin during sexual intercourse \par I discussed with FRANCISCO SAENZ that a circumcision is a surgical procedure that removes the prepuce which is the the skin that covers the glans or "head" of the penis.\par He is not willing to proceed We discussed elective nature of procedure.\par We discussed option of attempting betamethasone cream for short course for phimosis\par \par He also had chronic scrotal skin changes that he states occurs wheh he utilizes a rubber constriction ring\par We discussed:.1 skin moisturizers and 2. topical hydrocortisone )(0.25%) OTC\par \par \par \par 6.22.2022\par Patient returns. He has an episode of acute prostatitis with severe perineal pain. He was afebrile. He was seen by Dr. Angel Negron. Labs at that time demonstrated: \par wbc 11.8\par hct 42.3\par t 537\par psa 4.8\par urine proteus mirabilis \par renal USG normal\par GALL BLADDER POLYPS\par Patient responded immediately to cefuroxime. He is asymptomatic at this time. He is completing his course of antibiotics. We discussed the need for follow-up urine culture and repeat CBC. He is scheduled to see Dr. Negron next week. His PSA was elevated at the time of the infection. Prior PSA was normal. This is consistent with acute prostatitis. He also underwent a renal ultrasound this was normal. However, there were polyps noted in the gallbladder. We discussed this and the need for follow-up. He will review this with Dr. Negron. He was appreciated.\par \par He continues to complain of phimosis which is painful with erection. We again discussed elective circumcision. He is unwilling to proceed with this. We discussed the utilization of betamethasone cream. We discussed the limitation of this both in terms of affect and in terms of the duration of utilization. He wants to attempt this again for 14 days. We discussed gentle traction on the foreskin with the betamethasone to dilate the skin. We also discussed the utilization of dilators. I discussed that I have no personal experience with this but that has been reported.\par \par Plan:\par 1. Betamethasone cream\par 2. Follow-up with Dr. Negron regarding: A. White blood cell count B. Urine culture after cessation of antibiotics C. Gallbladder findings\par 3. Follow-up with me in 2 months\par recommend ciricumscision\par patient wants to continue to attempt betamathasome\par \par \par 7.12.2022\par returns with symptoms of :\par 1. incomplete voiding\par 2 dysuria\par discussed recurrent vs persistent infection and need to document resolution fo UTI\par will change to TMP/SX warned re allergic reaction and avoidance of sun\par Denies allergies\par Reports and normal renal ultrasound with Dr Negron\par Plan\par 1. TMP/SX\par 2. repeat urine culture after 2 weeks antibiotics\par \par 08.10.2022\par Reports that symptoms have resolved after TMP/SX\par \par discussed phimosis and elective circumcision\par patient does not want to proceed but wants to manage with hygiene, steroid cream sparingly as needed and manual retraction\par \par \par Plan\par 1. Follow up in 3 months\par 2. hygiene\par 3. betamethasone \par The FRANCISCO AHMADILORNE expressed fully understanding of the information provided, the consequences and the management.\par \par \par 11.9.2022\par Patient returns today with complaint regarding his phimosis. He is able to retract his foreskin. However when he has an erection it is painful. He states that this interferes with his sexual function. He is concerned about the pain associated with pulling the foreskin back and is concerned about the psychological impact of pain on his erectile function. He has taken oral phosphodiesterase inhibitors in the past. The response has been variable.\par \par On examination his foreskin is retractable. There is no significant phimosis. However with erection he states that it is difficult to pull the foreskin back.\par Again reviewed :\par \par \par I discussed Alie SAENZ that a circumcision is a surgical procedure that removes the prepuce which is the the skin that covers the glans or "head" of the penis.\par We discussed indications including:\par 1. phimosis\par 2. tearing of foreskin or frenulum with intercourse \par 3. recurrent infections\par 4. cosmetic reasons\par 5. [employer]an reasons\par \par We explain that some of the common complications of a circumcision are:\par 1 pain, \par 2 bleeding,\par 3 swelling\par We discussed:\par 1 low risk of infection\par 2 injury to the urethra\par 3 change in penile sensation, \par 4. appearance of shortening. \par We discussed elective nature of procedure.\par \par We discussed option of attempting betamethasone cream for short course for phimosis or Mycolog/Triamcinolone for balanoposthitis.\par Patient no willing to proceed\par \par \par discussed attempting Viagra 100 mg \par Warned re priapism risk and need for immediate intervention if erection lasts more than 2 hours. Patient instructed to report to an emergency room and explicitly inform staff of his condition and need for treatment.\par \par LUTs not interested in medical management\par Flow and PVR reviewed\par \par Plan\par 1. betamethasone\par 2 urine culture \par 3. followup in 3 months\par \par 1.26.23\par betamethasone for phimosis\par \par 3.7.2023\par betamethasone for phimosis\par

## 2023-03-07 NOTE — HISTORY OF PRESENT ILLNESS
[FreeTextEntry1] : 56 year old [place] [employer]\par single; rodas; sexually active \par utilizes condoms\par HIV (-); STI testing negative\par complaining:\par 1. erectile dysfunction persistent; attempted viagra 100mg ; variable by partner; variable response\par attempted cialis not as effective as viagra; JENELLE\par 3. nocturia 2-3; mild frequency; poor stream; no treatment; IPSS 18\par \par 3.31.2022\par returns re phimosis and scrotal pathology\par \par 5.25.2022\par \par 6.21.2022\par patient treated for prostatitis\par PROTEUS\par treated with cefuroxime 1 q 12 x 20 days (completed 18)\par complaining of lack of libido and decreased erectile function\par last sexual activity 3 weeks\par insertive sexual active; condoms\par \par 7.12.2022\par returns after completion\par after completion of antibiotics was asymptomatic\par 7 day later again developed symptoms\par \par 08.10.2022\par presents for repeat urine culture after completing antibiotics\par reports that symptoms have resolved \par \par \par 11.9.022\par s/p treatment of prostatitis\par urinary symptoms improved\par concerned re phimosis \par \par \par 2.21.23\par returns re phimosis after betamethasone\par \par 3.7.2023\par returns re phimosis on betamethasone